# Patient Record
(demographics unavailable — no encounter records)

---

## 2024-12-12 NOTE — ASSESSMENT
[FreeTextEntry1] : 47 y/o male with Down syndrome (non-verbal) presents as follow up for psoriatic arthritis. Pt is not able to provide history due to his underlying condition, but his DSP () Kaity Rushs was able to provide some information on initial visit.  Pt has diagnosis of psoriasis and has been having trouble walking around since he has been in the facility for the last 1.5 years. Pain is difficult to localize. Pt has had deformities of hands and fingers for at least those 1.5 years.  Pt is not on any medications on the medication sheet for pains.   Pt has Hx of psoriasis with deformities of hands and feet. Labwork by me without convincing signs of inflammatory arthritis. XRs show IP joint dislocation/subluxations due to self-trauma (continued self-mutilation based on his continued pulling at his fingers during exam). No signs of inflammatory arthritis including sacroiliitis on imaging.   Pt returns for much worsened pain of b/l legs. History is difficult to elicit due to pt's condition. Given pt's significant joint pains not explained by any other causes and severe psoriasis, I believe patient should be treated as psoriatic arthritis. Pt has been on LEF since 9/2023 with great improvement in pt's expression of pain and improved ambulation and greatly improved psoriasis activity. Pt follows with dermatology for psoriasis with topical creams. Patient is walking around without assistive devices.  - Last labwork 12/24 with normal CBC. Obtain CMP, Hep B/C, QTB for med safety  - c/w LEF 20mg PO daily for PsA. Side effects of LEF were discussed with the patient in detail including but not limited to GI upset, HTN, hepatotoxicity, and cytopenias.  This is a high-risk therapy requiring intense monitoring for toxicity. Will evaluate with frequent monitoring of BP, CBC, and LFTs.  - Continue follow up with dermatology for topical creams. - Last Hep B/C, QTB negative 12/2023. Repeat today. - RTC 6 months for follow up.

## 2024-12-12 NOTE — HISTORY OF PRESENT ILLNESS
[FreeTextEntry1] : HISTORY:  45 y/o male with Down syndrome (non-verbal) presents as follow up for psoriatic arthritis. Pt is not able to provide history due to his underlying condition, but his DSP () Kaity Escoto was able to provide some information on initial visit.  Pt has diagnosis of psoriasis and has been having trouble walking around since he has been in the facility for the last 1.5 years. Pain is difficult to localize. Pt has had deformities of hands and fingers for at least those 1.5 years.  Pt is not on any medications on the medication sheet for pains.   Pt has Hx of psoriasis with deformities of hands and feet. Labwork by me without convincing signs of inflammatory arthritis. XRs show IP joint dislocation/subluxations due to self-trauma (continued self-mutilation based on his continued pulling at his fingers during exam). No signs of inflammatory arthritis including sacroiliitis on imaging.   Pt returns for much worsened pain of b/l legs. History is difficult to elicit due to pt's condition. Given pt's significant joint pains not explained by any other causes and severe psoriasis, I believe patient should be treated as psoriatic arthritis. Pt has been on LEF since 9/2023 with great improvement in pt's expression of pain and improved ambulation but no changes in psoriasis. Pt follows with dermatology for psoriasis with topical creams.   INTERVAL HISTORY:  Pt has been continuing on LEF since 9/2023. There has been no changes in psoriasis and there has been no signs of discomfort or pain. Patient is walking around without assistive devices.  WORKUP:  Remarkable for (12/2022): ESR 25, CRP 8  Normal/neg (12/2022): CBC, CMP, EMANUEL, RF, CCP, HLA-B27, Hep B/C, Quantiferon TB  XR b/l hands (1/2023): Dislocation of 1st IP, subluxation of R 2nd DIP, L 4th PIP and DIPs  XR b/l knees (1/2023): Mild medial compartment narrowing b/l  XR b/l feet (1/2023): B/L short 4th metatarsal. Left pes planus.  Full Thickness Lip Wedge Repair (Flap) Text: Given the location of the defect and the proximity to free margins a full thickness wedge repair was deemed most appropriate.  Using a sterile surgical marker, the appropriate repair was drawn incorporating the defect and placing the expected incisions perpendicular to the vermilion border.  The vermilion border was also meticulously outlined to ensure appropriate reapproximation during the repair.  The area thus outlined was incised through and through with a #15 scalpel blade.  The muscularis and dermis were reaproximated with deep sutures following hemostasis. Care was taken to realign the vermilion border before proceeding with the superficial closure.  Once the vermilion was realigned the superfical and mucosal closure was finished.

## 2024-12-12 NOTE — PHYSICAL EXAM
[TextEntry] : GENERAL: Appears in no acute distress HEENT: No conjunctival erythema. NECK: Supple, no cervical lymphadenopathy, no thyromegaly CARDIOVASCULAR: RRR. S1, S2 auscultated. No murmurs or rubs. PULMONARY: Clear to auscultation b/l, no wheezes, rales, or crackles ABDOMINAL: Soft, nontender, nondistended. Bowel sounds present. No organomegaly. MSK: No active synovitis, swelling, erythema, or warmth. Fingers with various deformities. Pt continues to pull on and pick on the fingers throughout the exam SKIN: Much improved psoriasis with inactive patches without scaling of R elbow and b/l shins, covering <5% of body surface area NEURO: No focal deficits. PSYCH: AAOx3. Normal affect and thought process.

## 2025-06-12 NOTE — HISTORY OF PRESENT ILLNESS
[FreeTextEntry1] : HISTORY:  48 y/o male with Down syndrome (non-verbal) presents as follow up for psoriatic arthritis.  Pt is not able to provide history due to his underlying condition, but his DSP () Kaity Escoto was able to provide some information on initial visit.  Pt has diagnosis of psoriasis and has been having trouble walking around since he has been in the facility for the last 1.5 years. Pain is difficult to localize. Pt has had deformities of hands and fingers for at least those 1.5 years.  Pt is not on any medications on the medication sheet for pains.   Pt has Hx of psoriasis with deformities of hands and feet. Labwork by me without convincing signs of inflammatory arthritis. XRs show IP joint dislocation/subluxations due to self-trauma (continued self-mutilation based on his continued pulling at his fingers during exam). No signs of inflammatory arthritis including sacroiliitis on imaging.   Pt returns for much worsened pain of b/l legs. History is difficult to elicit due to pt's condition. Given pt's significant joint pains not explained by any other causes and severe psoriasis, I believe patient should be treated as psoriatic arthritis. Pt has been on LEF since 9/2023 with great improvement in pt's expression of pain and improved ambulation and greatly improved psoriasis activity. Pt follows with dermatology for psoriasis with topical creams. Patient is walking around without assistive devices.   INTERVAL HISTORY:  Pt here for 6 months follow up. Pt continues on LEF. Per staff report, no major changes in pt's medical conditions. Patient without any new or worsening expressions of pain. Pt had labwork ordered by me done in 3/2025.  WORKUP:  Remarkable for (12/2022): ESR 25, CRP 8  Normal/neg (12/2022): CBC, CMP, EMANUEL, RF, CCP, HLA-B27, Hep B/C, Quantiferon TB  XR b/l hands (1/2023): Dislocation of 1st IP, subluxation of R 2nd DIP, L 4th PIP and DIPs  XR b/l knees (1/2023): Mild medial compartment narrowing b/l  XR b/l feet (1/2023): B/L short 4th metatarsal. Left pes planus.

## 2025-06-12 NOTE — ASSESSMENT
[FreeTextEntry1] : 48 y/o male with Down syndrome (non-verbal) presents as follow up for psoriatic arthritis.  Pt is not able to provide history due to his underlying condition, but his DSP () Kaity Rushs was able to provide some information on initial visit.  Pt has diagnosis of psoriasis and has been having trouble walking around since he has been in the facility for the last 1.5 years. Pain is difficult to localize. Pt has had deformities of hands and fingers for at least those 1.5 years.  Pt is not on any medications on the medication sheet for pains.   Pt has Hx of psoriasis with deformities of hands and feet. Labwork by me without convincing signs of inflammatory arthritis. XRs show IP joint dislocation/subluxations due to self-trauma (continued self-mutilation based on his continued pulling at his fingers during exam). No signs of inflammatory arthritis including sacroiliitis on imaging.   Pt returns for much worsened pain of b/l legs. History is difficult to elicit due to pt's condition. Given pt's significant joint pains not explained by any other causes and severe psoriasis, I believe patient should be treated as psoriatic arthritis. Pt has been on LEF since 9/2023 with great improvement in pt's expression of pain and improved ambulation and greatly improved psoriasis activity. Pt follows with dermatology for psoriasis with topical creams. Patient is walking around without assistive devices.   - Last labwork 3/2025 with normal CBC, CMP (scanned in chart) - c/w LEF 20mg PO daily for PsA. Side effects of LEF were discussed with the patient in detail including but not limited to GI upset, HTN, hepatotoxicity, and cytopenias.  This is a high-risk therapy requiring intense monitoring for toxicity. Will evaluate with frequent monitoring of BP, CBC, and LFTs.  - Continue follow up with dermatology for topical creams.  - Last Hep B/C, QTB negative 3/2025.  - RTC 6 months for follow up.

## 2025-06-12 NOTE — PHYSICAL EXAM
[TextEntry] : GENERAL: Appears in no acute distress HEENT: No conjunctival erythema. NECK: Supple, no cervical lymphadenopathy, no thyromegaly CARDIOVASCULAR: RRR. S1, S2 auscultated. No murmurs or rubs. PULMONARY: Clear to auscultation b/l, no wheezes, rales, or crackles ABDOMINAL: Soft, nontender, nondistended. Bowel sounds present. No organomegaly. MSK: No active synovitis, swelling, erythema, or warmth. Fingers with various deformities. Pt continues to pull on and pick on the fingers throughout the exam SKIN: Mild  psoriasis with inactive patches without scaling of R elbow and b/l shins, covering <5% of body surface area NEURO: No focal deficits. PSYCH: AAOx3. Normal affect and thought process.